# Patient Record
Sex: MALE | Race: WHITE | HISPANIC OR LATINO | URBAN - METROPOLITAN AREA
[De-identification: names, ages, dates, MRNs, and addresses within clinical notes are randomized per-mention and may not be internally consistent; named-entity substitution may affect disease eponyms.]

---

## 2023-11-07 ENCOUNTER — APPOINTMENT (OUTPATIENT)
Dept: PULMONOLOGY | Facility: CLINIC | Age: 58
End: 2023-11-07
Payer: COMMERCIAL

## 2023-12-28 DIAGNOSIS — J45.42 MODERATE PERSISTENT ASTHMA WITH STATUS ASTHMATICUS (HHS-HCC): Primary | ICD-10-CM

## 2023-12-31 RX ORDER — FLUTICASONE PROPIONATE 110 UG/1
1 AEROSOL, METERED RESPIRATORY (INHALATION)
Qty: 12 G | Refills: 3 | Status: SHIPPED | OUTPATIENT
Start: 2023-12-31

## 2024-01-02 ENCOUNTER — APPOINTMENT (OUTPATIENT)
Dept: PULMONOLOGY | Facility: CLINIC | Age: 59
End: 2024-01-02
Payer: COMMERCIAL

## 2024-01-04 DIAGNOSIS — J45.909 MODERATE ASTHMA, UNSPECIFIED WHETHER COMPLICATED, UNSPECIFIED WHETHER PERSISTENT (HHS-HCC): Primary | ICD-10-CM

## 2024-01-05 RX ORDER — FLUTICASONE FUROATE 100 UG/1
1 POWDER RESPIRATORY (INHALATION) DAILY
Qty: 1 EACH | Refills: 1 | Status: SHIPPED | OUTPATIENT
Start: 2024-01-05 | End: 2024-02-20

## 2024-02-06 ENCOUNTER — APPOINTMENT (OUTPATIENT)
Dept: PULMONOLOGY | Facility: CLINIC | Age: 59
End: 2024-02-06
Payer: COMMERCIAL

## 2024-02-18 DIAGNOSIS — J45.909 MODERATE ASTHMA, UNSPECIFIED WHETHER COMPLICATED, UNSPECIFIED WHETHER PERSISTENT (HHS-HCC): ICD-10-CM

## 2024-02-20 RX ORDER — FLUTICASONE FUROATE 100 UG/1
POWDER RESPIRATORY (INHALATION)
Qty: 60 EACH | Refills: 5 | Status: SHIPPED | OUTPATIENT
Start: 2024-02-20

## 2024-02-21 NOTE — PROGRESS NOTES
I had the pleasure seeing Steavn Friend     Chief Complaint   Patient presents with    Chest Pain    Palpitations     He is here today for 10mo follow-up visit.          Current Outpatient Medications   Medication Instructions    Arnuity Ellipta 100 mcg/actuation inhaler USE 1 INHALATION BY MOUTH ONCE  DAILY . RINSE MOUTH WITH WATER  AFTER USE TO REDUCE AFTERTASTE  AND INCIDENCE OF CANDIDIASIS. DO NOT SWALLOW    fluticasone (Flovent HFA) 110 mcg/actuation inhaler 1 puff, inhalation, 2 times daily RT, Rinse mouth with water after use to reduce aftertaste and incidence of candidiasis. Do not swallow.        Stevan Friend is a 58 y.o. with:     Asthma   BYRON on CPAP  Chest Pain / tightness - episodes on and off since pt was in his 20s (history of drug use / cocaine for a couple years clean since around 1988. There was a concern of cardiac event at the time, but further testing ruled out) His pain is on the left side of the chest, axillary area. Non radiating, often associated with tingling in the arm. Not related to exertion or emotional stress.   Palpitations - he has them occasionally and they are relived with a tump. It appears that the heart rate is fast during the episodes.        TESTING    -ECHO/ TTE:  (5/18/23) LVEF 60%.  Sub-optimal image quality.      -CT Cardiac Score: (6/13/23) Total 0    Objective   Physical Exam  {exam; complete:43012}        Assessment/Plan   No problem-specific Assessment & Plan notes found for this encounter.

## 2024-02-23 ENCOUNTER — OFFICE VISIT (OUTPATIENT)
Dept: CARDIOLOGY | Facility: HOSPITAL | Age: 59
End: 2024-02-23
Payer: COMMERCIAL

## 2024-03-07 PROBLEM — R06.00 DYSPNEA: Status: ACTIVE | Noted: 2024-03-07

## 2024-03-07 PROBLEM — J30.9 ALLERGIC RHINITIS: Status: ACTIVE | Noted: 2024-03-07

## 2024-03-07 PROBLEM — J45.909 ASTHMA (HHS-HCC): Status: ACTIVE | Noted: 2024-03-07

## 2024-03-07 PROBLEM — G47.33 OSA ON CPAP: Status: ACTIVE | Noted: 2024-03-07

## 2024-03-07 RX ORDER — BIMATOPROST 0.1 MG/ML
SOLUTION/ DROPS OPHTHALMIC
COMMUNITY
Start: 2023-12-27

## 2024-03-07 RX ORDER — APIXABAN 5 MG/1
5 TABLET, FILM COATED ORAL 2 TIMES DAILY
COMMUNITY
Start: 2023-11-14 | End: 2024-04-01 | Stop reason: SDUPTHER

## 2024-03-07 NOTE — PROGRESS NOTES
"I had the pleasure seeing Stevan Friend     Chief Complaint   Patient presents with    Palpitations    Chest Pain     He is here for an 11 month follow-up visit.          Current Outpatient Medications   Medication Instructions    Arnuity Ellipta 100 mcg/actuation inhaler USE 1 INHALATION BY MOUTH ONCE  DAILY . RINSE MOUTH WITH WATER  AFTER USE TO REDUCE AFTERTASTE  AND INCIDENCE OF CANDIDIASIS. DO NOT SWALLOW    Eliquis 5 mg, oral, 2 times daily    fluticasone (Flovent HFA) 110 mcg/actuation inhaler 1 puff, inhalation, 2 times daily RT, Rinse mouth with water after use to reduce aftertaste and incidence of candidiasis. Do not swallow.    Lumigan 0.01 % ophthalmic solution         Stevan Friend is a 58 y.o. with:     Asthma   BYRON on CPAP  Chest Pain / tightness - episodes on and off since pt was in his 20s (history of drug use / cocaine for a couple years clean since around 1988. There was a concern of cardiac event at the time, but further testing ruled out) His pain is on the left side of the chest, axillary area. Non radiating, often associated with tingling in the arm. Not related to exertion or emotional stress.   Palpitations - he has them occasionally and they are relived with a tump. It appears that the heart rate is fast during the episodes.     TESTING    -ECHO/ TTE:  (5/18/23) LVEF 60%.  Suboptimal image quality.      Objective   Physical Exam  /74 (BP Location: Left arm, Patient Position: Sitting)   Pulse 61   Ht 1.778 m (5' 10\")   Wt 122 kg (268 lb 3.2 oz)   SpO2 97%   BMI 38.48 kg/m²     General Appearance:  Alert, cooperative, no distress, appears stated age   Head:  Normocephalic, without obvious abnormality, atraumatic   Eyes:  PERRL, conjunctiva/corneas clear, EOM's intact, fundi benign, both eyes   Ears:  Normal TM's and external ear canals, both ears   Nose: Nares normal, septum midline, mucosa normal, no drainage or sinus tenderness   Throat: Lips, mucosa, and tongue normal; teeth " and gums normal   Neck: Supple, symmetrical, trachea midline, no adenopathy, thyroid: not enlarged, symmetric, no tenderness/mass/nodules, no carotid bruit or JVD   Back:   Symmetric, no curvature, ROM normal, no CVA tenderness   Lungs:   Clear to auscultation bilaterally, respirations unlabored   Chest Wall:  No tenderness or deformity   Heart:  Regular rate and rhythm, S1, S2 normal, no murmur, rub or gallop   Abdomen:   Soft, non-tender, bowel sounds active all four quadrants,  no masses, no organomegaly   Genitalia:  Normal male   Rectal:  Normal tone, normal prostate, no masses or tenderness;  guaiac negative stool   Extremities: Extremities normal, atraumatic, no cyanosis or edema   Pulses: 2+ and symmetric   Skin: Skin color, texture, turgor normal, no rashes or lesions   Lymph nodes: Cervical, supraclavicular, and axillary nodes normal   Neurologic: Normal           Assessment/Plan   He is here for a follow up. No major symptoms. ECHO was normal. He does have [employer] and monitors his heart rate. The only complain is lack of energy. We discussed weight loss and exercise.     He is on Eliquis because of a DVT and apparently was diagnosed with hypercoagulable state. He was told he needs life long treatment. He wants a second opinion. We will set him up to see one of our vascular medicine specialists.

## 2024-03-08 ENCOUNTER — OFFICE VISIT (OUTPATIENT)
Dept: CARDIOLOGY | Facility: HOSPITAL | Age: 59
End: 2024-03-08
Payer: COMMERCIAL

## 2024-03-08 VITALS
SYSTOLIC BLOOD PRESSURE: 168 MMHG | BODY MASS INDEX: 38.39 KG/M2 | HEART RATE: 61 BPM | HEIGHT: 70 IN | WEIGHT: 268.2 LBS | DIASTOLIC BLOOD PRESSURE: 74 MMHG | OXYGEN SATURATION: 97 %

## 2024-03-08 DIAGNOSIS — I82.5Z2 CHRONIC DEEP VEIN THROMBOSIS (DVT) OF DISTAL VEIN OF LEFT LOWER EXTREMITY (MULTI): Primary | ICD-10-CM

## 2024-03-08 DIAGNOSIS — R07.9 CHEST PAIN, UNSPECIFIED TYPE: ICD-10-CM

## 2024-03-08 PROCEDURE — 93005 ELECTROCARDIOGRAM TRACING: CPT | Performed by: INTERNAL MEDICINE

## 2024-03-08 PROCEDURE — 99214 OFFICE O/P EST MOD 30 MIN: CPT | Performed by: INTERNAL MEDICINE

## 2024-03-15 PROBLEM — J30.2 SEASONAL ALLERGIES: Status: ACTIVE | Noted: 2018-06-04

## 2024-03-15 PROBLEM — E78.5 HYPERLIPIDEMIA: Status: ACTIVE | Noted: 2018-06-04

## 2024-03-22 ENCOUNTER — APPOINTMENT (OUTPATIENT)
Dept: CARDIOLOGY | Facility: CLINIC | Age: 59
End: 2024-03-22
Payer: COMMERCIAL

## 2024-03-22 RX ORDER — SILDENAFIL 50 MG/1
50 TABLET, FILM COATED ORAL AS NEEDED
COMMUNITY
Start: 2024-03-09

## 2024-04-01 ENCOUNTER — CONSULT (OUTPATIENT)
Dept: CARDIOLOGY | Facility: CLINIC | Age: 59
End: 2024-04-01
Payer: COMMERCIAL

## 2024-04-01 VITALS
DIASTOLIC BLOOD PRESSURE: 79 MMHG | WEIGHT: 262 LBS | BODY MASS INDEX: 37.51 KG/M2 | HEIGHT: 70 IN | HEART RATE: 63 BPM | OXYGEN SATURATION: 98 % | SYSTOLIC BLOOD PRESSURE: 139 MMHG

## 2024-04-01 DIAGNOSIS — I82.5Z2 CHRONIC DEEP VEIN THROMBOSIS (DVT) OF DISTAL VEIN OF LEFT LOWER EXTREMITY (MULTI): ICD-10-CM

## 2024-04-01 PROCEDURE — 99214 OFFICE O/P EST MOD 30 MIN: CPT | Performed by: NURSE PRACTITIONER

## 2024-04-01 PROCEDURE — 99204 OFFICE O/P NEW MOD 45 MIN: CPT | Performed by: NURSE PRACTITIONER

## 2024-04-01 RX ORDER — APIXABAN 5 MG/1
5 TABLET, FILM COATED ORAL 2 TIMES DAILY
Qty: 180 TABLET | Refills: 3 | Status: SHIPPED | OUTPATIENT
Start: 2024-04-01 | End: 2025-04-01

## 2024-04-01 NOTE — PROGRESS NOTES
Stevan Friend is a 58 y.o. male     History Of Present Illness   Mr Friend is a 58 year old male with a PMH of Factor V, H/O right iliac vein DVT, hyperlipidemia, COVID 19, chronic back pain, H/O frequent PVCs, obstructive sleep apnea, here for a second opinion.  In May of 2023, he was placed on eliquis and under went coag panels that were positive for Factor V.  He is here to discuss if       Social HX          Family HX  No family history on file.       Review Of Systems   Constitutional: not feeling tired.   Eyes: no eyesight problems.  No vision loss or change in vision  ENT: no hearing loss and no nosebleeds.   Cardiovascular: No intermittent leg claudication,   No chest pain, no tightness or heavy pressure  No shortness of breath,  No palpitations,  No lower extremity edema  The heart rate is regular  Respiratory: no chronic cough and no shortness of breath.   Gastrointestinal: no change in bowel habits and no blood in stools.   Genitourinary: no urinary frequency.   Skin: no skin rashes.   Neurological: No frequent falls.   No dizziness  No weakness  Denies headaches  Psychiatric: no depression and not suicidal.   All other systems have been reviewed and are negative for complaint.        Allergies  Allergies   Allergen Reactions    Atorvastatin Myalgia    Shellfish Containing Products Unknown    Animal Dander Runny nose    House Dust Runny nose    Ragweed Runny nose    Tree And Shrub Pollen Runny nose          Vitals  There were no vitals taken for this visit.        Constitutional: alert and in no acute distress.   Eyes: no erythema, swelling or discharge from the eye .   Neck: neck is supple, symmetric, trachea midline, no masses  and no thyromegaly .   Pulmonary: No increased work of breathing or signs of respiratory distress    Lungs clear to auscultation.    Auscultation of the lungs revealed no expiratory wheezing, normal expiratory time and no inspiratory wheezing. no rales or crackles were heard  bilaterally. no rhonchi  No friction rub. no wheezing.   No diminished breath sounds. no bronchial breath sounds.   Cardiovascular: carotid pulses 2+ bilaterally with no bruit    JVP was normal, no thrills ,   Regular rhythm, normal S1 and S2, no murmurs  the heart rate was normal normal S1, normal S2, no S3, no S4 no murmurs were heard.,   Pedal pulses 2+ bilaterally    No edema .   + engorged varicosities to bilateral thighs and lower legs  Feet turn purple in the dependent position. Color returns to normal with elevation.  Abdomen: abdomen non-tender, no masses  and no hepatomegaly . No pulsatile mass noted  Skin: skin warm and dry, normal skin turgor .   Psychiatric judgment and insight is normal  and oriented to person, place and time .           Current/Home Meds    Current Outpatient Medications:     Arnuity Ellipta 100 mcg/actuation inhaler, USE 1 INHALATION BY MOUTH ONCE  DAILY . RINSE MOUTH WITH WATER  AFTER USE TO REDUCE AFTERTASTE  AND INCIDENCE OF CANDIDIASIS. DO NOT SWALLOW, Disp: 60 each, Rfl: 5    Eliquis 5 mg tablet, Take 1 tablet (5 mg) by mouth 2 times a day., Disp: , Rfl:     fluticasone (Flovent HFA) 110 mcg/actuation inhaler, Inhale 1 puff 2 times a day. Rinse mouth with water after use to reduce aftertaste and incidence of candidiasis. Do not swallow., Disp: 12 g, Rfl: 3    Lumigan 0.01 % ophthalmic solution, , Disp: , Rfl:     sildenafil (Viagra) 50 mg tablet, Take 1 tablet (50 mg) by mouth if needed., Disp: , Rfl:        Labs                   Assessment/Plan      DVT:  +Factor V deficiency  + Right iliac vein DVT  + varicose veins that are painful and engorged  Will have him under go a venous duplex with reflux  He is to wear his compression stockings from am to HS  He is to lose 1-2 pounds per week  He is to exercise for 150 mins a week  He is to continue eliquis BID  He is to elevate his legs above his heart 2-3 times a day for 20-30 mins  Follow up in 2 months or sooner for any  questions, concerns or complaints

## 2024-04-05 ENCOUNTER — TELEPHONE (OUTPATIENT)
Dept: CARDIOLOGY | Facility: CLINIC | Age: 59
End: 2024-04-05
Payer: COMMERCIAL

## 2024-04-05 NOTE — TELEPHONE ENCOUNTER
Answering service message: Needed an Rx for compression socks but wanted a call to know how to get them.

## 2024-04-15 DIAGNOSIS — I82.5Z2 CHRONIC DEEP VEIN THROMBOSIS (DVT) OF DISTAL VEIN OF LEFT LOWER EXTREMITY (MULTI): ICD-10-CM

## 2024-04-15 RX ORDER — APIXABAN 5 MG/1
5 TABLET, FILM COATED ORAL 2 TIMES DAILY
Qty: 180 TABLET | Refills: 3 | Status: CANCELLED | OUTPATIENT
Start: 2024-04-15 | End: 2025-04-15

## 2024-04-16 ENCOUNTER — APPOINTMENT (OUTPATIENT)
Dept: PULMONOLOGY | Facility: HOSPITAL | Age: 59
End: 2024-04-16
Payer: COMMERCIAL

## 2024-04-23 ENCOUNTER — TELEPHONE (OUTPATIENT)
Dept: PULMONOLOGY | Facility: HOSPITAL | Age: 59
End: 2024-04-23
Payer: COMMERCIAL

## 2024-04-23 ENCOUNTER — APPOINTMENT (OUTPATIENT)
Dept: VASCULAR MEDICINE | Facility: HOSPITAL | Age: 59
End: 2024-04-23
Payer: COMMERCIAL

## 2024-04-24 NOTE — TELEPHONE ENCOUNTER
Pt's wife, Ghislaine, called stating that pt's insurance doesn't cover Arnuity anymore. She is requesting for a new inhaler to be prescribed. Pt hasn't been seen by pulmonary since June 2021 and has canceled his last 4 visits with Dr. Radford. She was informed that Dr. Radford may not refill the Arnuity or prescribe a new inhaler due to pt not being seen in office in almost 3 years. It was also explained to pt's wife that it is very important to attend these visits so the providers can continue to prescribe medications. She sounded frustrated, but verbalized understanding. She also mentioned that the pt is out of town until Friday and she was hoping to get him an inhaler prior to his return. Dr. Radford notified.

## 2024-05-17 DIAGNOSIS — J45.902 ASTHMA WITH STATUS ASTHMATICUS, UNSPECIFIED ASTHMA SEVERITY, UNSPECIFIED WHETHER PERSISTENT (HHS-HCC): Primary | ICD-10-CM

## 2024-05-17 RX ORDER — FLUTICASONE PROPIONATE AND SALMETEROL 100; 50 UG/1; UG/1
1 POWDER RESPIRATORY (INHALATION)
COMMUNITY
Start: 2024-04-23 | End: 2024-05-17 | Stop reason: SDUPTHER

## 2024-05-17 RX ORDER — FLUTICASONE PROPIONATE AND SALMETEROL 100; 50 UG/1; UG/1
1 POWDER RESPIRATORY (INHALATION)
Qty: 60 EACH | Refills: 1 | Status: SHIPPED | OUTPATIENT
Start: 2024-05-17 | End: 2024-05-28 | Stop reason: SDUPTHER

## 2024-05-22 ENCOUNTER — HOSPITAL ENCOUNTER (OUTPATIENT)
Dept: VASCULAR MEDICINE | Facility: HOSPITAL | Age: 59
Discharge: HOME | End: 2024-05-22
Payer: COMMERCIAL

## 2024-05-22 DIAGNOSIS — I82.5Z2 CHRONIC DEEP VEIN THROMBOSIS (DVT) OF DISTAL VEIN OF LEFT LOWER EXTREMITY (MULTI): ICD-10-CM

## 2024-05-22 DIAGNOSIS — M79.89 OTHER SPECIFIED SOFT TISSUE DISORDERS: ICD-10-CM

## 2024-05-22 PROCEDURE — 93970 EXTREMITY STUDY: CPT | Performed by: SURGERY

## 2024-05-22 PROCEDURE — 93970 EXTREMITY STUDY: CPT

## 2024-05-28 DIAGNOSIS — J45.902 ASTHMA WITH STATUS ASTHMATICUS, UNSPECIFIED ASTHMA SEVERITY, UNSPECIFIED WHETHER PERSISTENT (HHS-HCC): ICD-10-CM

## 2024-05-28 RX ORDER — FLUTICASONE PROPIONATE AND SALMETEROL 100; 50 UG/1; UG/1
POWDER RESPIRATORY (INHALATION)
Refills: 0 | OUTPATIENT
Start: 2024-05-28

## 2024-05-28 RX ORDER — FLUTICASONE PROPIONATE AND SALMETEROL 100; 50 UG/1; UG/1
1 POWDER RESPIRATORY (INHALATION)
Qty: 60 EACH | Refills: 1 | Status: SHIPPED | OUTPATIENT
Start: 2024-05-28

## 2024-06-05 ENCOUNTER — OFFICE VISIT (OUTPATIENT)
Dept: CARDIOLOGY | Facility: CLINIC | Age: 59
End: 2024-06-05
Payer: COMMERCIAL

## 2024-06-05 VITALS
HEIGHT: 70 IN | WEIGHT: 257 LBS | DIASTOLIC BLOOD PRESSURE: 73 MMHG | OXYGEN SATURATION: 98 % | SYSTOLIC BLOOD PRESSURE: 146 MMHG | HEART RATE: 68 BPM | BODY MASS INDEX: 36.79 KG/M2

## 2024-06-05 DIAGNOSIS — I83.899 COMPLICATED VARICOSE VEINS: Primary | ICD-10-CM

## 2024-06-05 PROCEDURE — 99213 OFFICE O/P EST LOW 20 MIN: CPT | Performed by: NURSE PRACTITIONER

## 2024-06-05 NOTE — PROGRESS NOTES
Stevan Friend is a 58 y.o. male     History Of Present Illness   Mr Friend is a 58 year old male with a PMH of Factor V, H/O right iliac vein DVT, hyperlipidemia, COVID 19, chronic back pain, H/O frequent PVCs, obstructive sleep apnea, her for a follow up of his varicose veins and to discuss the results of his most recent venous duplex with reflux scan.   In May of 2023, he was placed on eliquis and under went coag panels that were positive for Factor V.       Social HX          Family HX  No family history on file.       Review Of Systems   Constitutional: not feeling tired.   Eyes: no eyesight problems.  No vision loss or change in vision  ENT: no hearing loss and no nosebleeds.   Cardiovascular: No intermittent leg claudication,   No chest pain, no tightness or heavy pressure  No shortness of breath,  No palpitations,  + painful, cramping legs with lower extremity edema  The heart rate is regular  +bilateral varicose veins  Respiratory: no chronic cough and no shortness of breath.   Gastrointestinal: no change in bowel habits and no blood in stools.   Genitourinary: no urinary frequency.   Skin: no skin rashes.   Neurological: No frequent falls.   No dizziness  No weakness  Denies headaches  Psychiatric: no depression and not suicidal.   All other systems have been reviewed and are negative for complaint.        Allergies  Allergies   Allergen Reactions    Atorvastatin Myalgia    Shellfish Containing Products Unknown    Animal Dander Runny nose    House Dust Runny nose    Ragweed Runny nose    Tree And Shrub Pollen Runny nose          Vitals  Visit Vitals  /73 (BP Location: Left arm, Patient Position: Sitting)   Pulse 68           Physical Exam  Cardiovascular: carotid pulses 2+ bilaterally with no bruit    JVP was normal, no thrills ,   Regular rhythm, normal S1 and S2, no murmurs  the heart rate was normal normal S1, normal S2, no S3, no S4 no murmurs were heard.,   Pedal pulses 2+ bilaterally    No  edema .   + engorged varicosities to bilateral thighs and lower legs  Feet turn purple in the dependent position. Color returns to normal with elevation.      Current/Home Meds    Current Outpatient Medications:     Arnuity Ellipta 100 mcg/actuation inhaler, USE 1 INHALATION BY MOUTH ONCE  DAILY . RINSE MOUTH WITH WATER  AFTER USE TO REDUCE AFTERTASTE  AND INCIDENCE OF CANDIDIASIS. DO NOT SWALLOW, Disp: 60 each, Rfl: 5    Eliquis 5 mg tablet, Take 1 tablet (5 mg) by mouth 2 times a day., Disp: 180 tablet, Rfl: 3    fluticasone (Flovent HFA) 110 mcg/actuation inhaler, Inhale 1 puff 2 times a day. Rinse mouth with water after use to reduce aftertaste and incidence of candidiasis. Do not swallow., Disp: 12 g, Rfl: 3    Lumigan 0.01 % ophthalmic solution, , Disp: , Rfl:     sildenafil (Viagra) 50 mg tablet, Take 1 tablet (50 mg) by mouth if needed., Disp: , Rfl:     Wixela Inhub 100-50 mcg/dose diskus inhaler, Inhale 1 puff 2 times a day., Disp: 60 each, Rfl: 1       Labs                 Cardiac Service Results:  VENOUS DUPLEX WITH REFLUX: Right Lower Venous Insufficiency: Reflux is noted in the saphenofemoral junction vein. There is reflux noted in the common femoral vein. No other reflux is seen.  There is a tributary that arises off of the GSV in the mid thigh and travels throughout the thigh and calf. No reflux is noted in this tributary.  The SSV arises proximal to the popliteal([place]). No reflux is seen in the Vein of [place].  Left Lower Venous Insufficiency: Reflux is noted in the saphenofemoral junction vein. There is reflux noted in the common femoral vein. There is a tributary that arises off of the GSV in the mid thigh with 6.1 seconds of reflux.  There is a (4.5mm) seen in the distal calf with 4.8 seconds of reflux.  The SSV arises proximal to the popliteal. No reflux is noted in the Vein of [place].  Right Lower Venous: No evidence of acute deep vein thrombus visualized in the right lower  extremity.  Left Lower Venous: No evidence of acute deep vein thrombus visualized in the left lower extremity.      Assessment/Plan      COMPLICATED VARICOSE VEINS:  As above, venous duplex shows evidence of venous reflux bilaterally. He is wearing his compression stockings as directed, he is elevating his legs and exercising as directed with only mild relief in the edema, cramping and pain.  Will place a referral to Dr Lopez in the vein clinic for further evaluation and treatment.  He will follow up with me as needed.

## 2024-06-11 ENCOUNTER — OFFICE VISIT (OUTPATIENT)
Dept: VASCULAR SURGERY | Facility: CLINIC | Age: 59
End: 2024-06-11
Payer: COMMERCIAL

## 2024-06-11 VITALS
BODY MASS INDEX: 37.45 KG/M2 | WEIGHT: 261 LBS | HEART RATE: 71 BPM | SYSTOLIC BLOOD PRESSURE: 153 MMHG | DIASTOLIC BLOOD PRESSURE: 91 MMHG

## 2024-06-11 DIAGNOSIS — I83.899 COMPLICATED VARICOSE VEINS: ICD-10-CM

## 2024-06-11 PROCEDURE — 1036F TOBACCO NON-USER: CPT | Performed by: SURGERY

## 2024-06-11 PROCEDURE — 99203 OFFICE O/P NEW LOW 30 MIN: CPT | Performed by: SURGERY

## 2024-06-11 NOTE — PROGRESS NOTES
Subjective   Patient ID: Stevan Friend is a 58 y.o. male who presents for New Patient Visit (Varicose veins/ DVT ).  HPI  Patient is here for evaluation of both lower extremity secondary to varicose veins.  He has some burning in the popliteal space bilaterally left slightly worse than right.  Some skin discoloration minimally noted both lower extremities minimal ankle swelling noted bilaterally  Patient has had prior history of iliac thrombus on the right side currently on Eliquis secondary to factor V Leiden deficiency.  No shortness of breath or chest pain no evidence of previous pulmonary emboli noted.  He does not claudicate  No ulcer sores noted  .  Review of Systems  Review of Systems    Constitutional:  no generalized malaise overall feels well, energy levels intact, no complaints specifically noted  HEENT:  No blurry vision, no visual aides noted, no hearing loss no ear ache no nose bleeds noted, no dysphagia, no congestion otherwise no pertinent positives noted  Cardiovascular:  no palpitations, chest pain or heaviness noted, no leg swelling, no numbness or tingling in the lower extremity noted  Respiratory:  no shortness of breath, no productive cough noted, no conversation dyspnea or difficulty breathing  Gastrointestinal:  no abdominal pain, no nausea or vomiting, appetite intact, no bowel irregularities noted  Genitourinary:   no urinary incontinence, frequency or urgency issues noted, no hematuria or burning sensation issues  Musculoskeletal:  No muscle aches or pains, no joint discomfort noted, no back pain noted otherwise feels well  Skin: no ulcerations, skin color issues or wounds upper or lower extremities  Neurologic: no dizziness, no hemiplegia, no hemiparesis, no obvious visual deficits noted  Psychiatric: no depression, no memory loss noted, no suicidal ideation  Endocrine: no weight loss or gain, no temperature concerns hot or cold intolerance  Hemogolotic/Lymphatic: no bruising, excessive  bleeding, no swelling in the groins or neck noted    Objective   Physical Exam  Physical exam    Constitutional: alert and in no acute distress verbal  Eyes: No erythema swelling or discharge noted  Neck: supple, symmetric, trachea midline, no masses noted  Cardiovascular: Carotid pulses 2+, no obvious bruit, no Jugular distension noted, no thrill, heart regular rate, lower extremity vascular exam intact, cap refill <2 sec  Pulmonary:  Bilateral breath sounds intact, clear with rales rhonchi or wheeze  Abdomen: soft non tender, no pulsatile masses noted, no rebound rigidity or guarding noted  Skin: intact warm no abnormal turgor  Psychiatric: alert without any obvious cognitive issues, oriented to person, place, and time    Assessment/Plan   Symptomatic varicose veins bilateral extremity  Minimal ankle swelling will check IVC duplex secondary to previous history of iliac thrombus  Reflux study demonstrates common femoral reflux however minimal superficial disease noted.  Follow-up after testing further recommendations pending.  Continue compression elevation and regular activity and ambulation program.         Micheal Manning DO 06/11/24 3:16 PM

## 2024-06-12 DIAGNOSIS — J45.902 ASTHMA WITH STATUS ASTHMATICUS, UNSPECIFIED ASTHMA SEVERITY, UNSPECIFIED WHETHER PERSISTENT (HHS-HCC): ICD-10-CM

## 2024-06-12 RX ORDER — FLUTICASONE PROPIONATE AND SALMETEROL 100; 50 UG/1; UG/1
1 POWDER RESPIRATORY (INHALATION)
Qty: 3 EACH | Refills: 3 | Status: SHIPPED | OUTPATIENT
Start: 2024-06-12

## 2024-06-25 ENCOUNTER — OFFICE VISIT (OUTPATIENT)
Dept: PULMONOLOGY | Facility: HOSPITAL | Age: 59
End: 2024-06-25
Payer: COMMERCIAL

## 2024-06-25 VITALS
OXYGEN SATURATION: 97 % | WEIGHT: 258 LBS | BODY MASS INDEX: 37.02 KG/M2 | TEMPERATURE: 97.5 F | HEART RATE: 72 BPM | DIASTOLIC BLOOD PRESSURE: 89 MMHG | SYSTOLIC BLOOD PRESSURE: 154 MMHG

## 2024-06-25 DIAGNOSIS — G47.33 OSA (OBSTRUCTIVE SLEEP APNEA): Primary | ICD-10-CM

## 2024-06-25 DIAGNOSIS — J45.902 ASTHMA WITH STATUS ASTHMATICUS, UNSPECIFIED ASTHMA SEVERITY, UNSPECIFIED WHETHER PERSISTENT (HHS-HCC): ICD-10-CM

## 2024-06-25 DIAGNOSIS — R07.89 CHEST PRESSURE: ICD-10-CM

## 2024-06-25 DIAGNOSIS — G47.33 OSA ON CPAP: ICD-10-CM

## 2024-06-25 PROCEDURE — 99214 OFFICE O/P EST MOD 30 MIN: CPT | Performed by: INTERNAL MEDICINE

## 2024-06-25 PROCEDURE — 1036F TOBACCO NON-USER: CPT | Performed by: INTERNAL MEDICINE

## 2024-06-25 RX ORDER — ALBUTEROL SULFATE 90 UG/1
2 AEROSOL, METERED RESPIRATORY (INHALATION) EVERY 4 HOURS PRN
Qty: 8.5 G | Refills: 11 | Status: SHIPPED | OUTPATIENT
Start: 2024-06-25 | End: 2025-06-25

## 2024-06-25 RX ORDER — FLUTICASONE PROPIONATE AND SALMETEROL 250; 50 UG/1; UG/1
1 POWDER RESPIRATORY (INHALATION)
Qty: 60 EACH | Refills: 11 | Status: SHIPPED | OUTPATIENT
Start: 2024-06-25 | End: 2025-06-25

## 2024-06-25 ASSESSMENT — LIFESTYLE VARIABLES: HOW OFTEN DO YOU HAVE A DRINK CONTAINING ALCOHOL: NEVER

## 2024-06-25 ASSESSMENT — PAIN SCALES - GENERAL: PAINLEVEL: 2

## 2024-06-25 NOTE — PROGRESS NOTES
Department of Medicine  Division of Pulmonary, Critical Care, and Sleep Medicine  Follow-Up Visit  [address] [facility]    Background:  here for follow up from last visit 4/7/2021    HPI (DATE):  CC: occasional dyspnea    58 year old male presenting for follow up of asthma.     Methacholine challenge was positive and the patients history of worsening symptoms immediately after exposure to chemical inhalation, on top of childhood asthma.   Past workup also included finding allergy to dust mites on RAP; Immunoglobulins wnl. CBC without significant eosinophilia.      BYRON- using NIV, hasn't seen sleep in awhile- will refer     Feels frustrated with weight, has been having issues lately with symptoms.     ACT today of 24, last was 23.       Asthma triggers: strong odors, strenuous activity.  Methacholine challenge PC(20): 0.47     Asthma  - Continue Albuterol HFA 1-2puff q4-6h as needed for shortness of breath.  - Wixela 250 1 inhalation daily and rinse after   - Avoid known triggers.  Needs refills     Allergic rhinitis  - Continue Fluticasone nasal   - Start Azelastine nasal spray twice daily     Obesity- discussed dietary improvements, can further discuss with PCP     BYRON on NIV- will refer to sleep    ROS: having some chest pressure in the morning, no radiating, 1-3/10, no associated chest pain,palpitations, or sob; is worse after eating junk food, some GERD symptoms, no fevers, chills, nausea, emesis, diarrhea, edema, other ros reviewed and negative for complaints     No new changes in PMHx     Immunization History:  Immunization History   Administered Date(s) Administered    Moderna SARS-CoV-2 Vaccination 03/25/2021, 04/24/2021, 12/03/2021, 08/15/2022       Current Medications:  Current Outpatient Medications   Medication Instructions    Arnuity Ellipta 100 mcg/actuation inhaler USE 1 INHALATION BY MOUTH ONCE  DAILY . RINSE MOUTH WITH WATER  AFTER USE TO REDUCE AFTERTASTE  AND INCIDENCE OF  CANDIDIASIS. DO NOT SWALLOW    Eliquis 5 mg, oral, 2 times daily    fluticasone (Flovent HFA) 110 mcg/actuation inhaler 1 puff, inhalation, 2 times daily RT, Rinse mouth with water after use to reduce aftertaste and incidence of candidiasis. Do not swallow.    fluticasone propion-salmeteroL (Wixela Inhub) 100-50 mcg/dose diskus inhaler 1 puff, inhalation, 2 times daily RT    Lumigan 0.01 % ophthalmic solution     sildenafil (VIAGRA) 50 mg, oral, As needed        Drug Allergies/Intolerances:  Allergies   Allergen Reactions    Atorvastatin Myalgia    Shellfish Containing Products Unknown    Animal Dander Runny nose    House Dust Runny nose    Ragweed Runny nose    Tree And Shrub Pollen Runny nose        Physical Examination:  There were no vitals taken for this visit.     General: ambulated independently; no acute distress; well-nourished; work of breathing was not increased; normal vocal character  HEENT: normocephalic; anicteric sclerae; conjunctivae not injected; nasal mucosa was unremarkable; oropharynx was clear without evidence of thrush; dentition was good.  Neck: supple; no lymphadenopathy or thyromegaly.  Chest: clear to auscultation bilaterally; no chest wall deformity.  Cardiac: regular rhythm; no gallop or murmur.  Extremities: no leg edema; no digital clubbing; 2+ pulses  Psychiatric: did not appear depressed or anxious.    Pulmonary Function Test Results       As above, prior    Chest Radiograph     CHEST 2 VIEW 07/08/2020    Narrative  MRN: [id number]  Patient Name: IRVIN HARRIS    STUDY:   CHEST 2 VIEW PA AND LAT;  7/8/2020 5:54 pm    INDICATION:  Unspecified asthma, uncomplicated.    COMPARISON:  None.    ACCESSION NUMBER(S):  [id number]    ORDERING CLINICIAN:  BING RAMIREZ    FINDINGS:      CARDIOMEDIASTINAL SILHOUETTE:  Cardiomediastinal silhouette is normal in size and configuration.    LUNGS:  Lungs are clear.    ABDOMEN:  No remarkable upper abdominal findings.    BONES:  No acute osseous  changes.    Impression  1.  No evidence of acute cardiopulmonary process.      Echocardiogram     No results found for this or any previous visit from the past 365 days.       Chest CT Scan     No results found for this or any previous visit from the past 365 days.     Hematologic Parameters     WBC   Date/Time Value Ref Range Status   07/09/2020 03:33 PM 5.7 4.4 - 11.3 x10E9/L Final     Neutrophils Absolute   Date/Time Value Ref Range Status   07/09/2020 03:33 PM 3.28 1.20 - 7.70 x10E9/L Final     Neutrophils %   Date/Time Value Ref Range Status   07/09/2020 03:33 PM 57.5 40.0 - 80.0 % Final     Lymphocytes %   Date/Time Value Ref Range Status   07/09/2020 03:33 PM 34.6 13.0 - 44.0 % Final     Monocytes %   Date/Time Value Ref Range Status   07/09/2020 03:33 PM 5.6 2.0 - 10.0 % Final     Basophils %   Date/Time Value Ref Range Status   07/09/2020 03:33 PM 0.3 0.0 - 2.0 % Final     Eosinophils Absolute   Date/Time Value Ref Range Status   07/09/2020 03:33 PM 0.10 0.00 - 0.70 x10E9/L Final     Eosinophils %   Date/Time Value Ref Range Status   07/09/2020 03:33 PM 1.7 0.0 - 6.0 % Final     Hemoglobin   Date/Time Value Ref Range Status   07/09/2020 03:33 PM 15.5 13.5 - 17.5 g/dL Final     Hematocrit   Date/Time Value Ref Range Status   07/09/2020 03:33 PM 47.4 41.0 - 52.0 % Final     RBC   Date/Time Value Ref Range Status   07/09/2020 03:33 PM 5.06 4.50 - 5.90 x10E12/L Final     MCV   Date/Time Value Ref Range Status   07/09/2020 03:33 PM 94 80 - 100 fL Final     MCHC   Date/Time Value Ref Range Status   07/09/2020 03:33 PM 32.7 32.0 - 36.0 g/dL Final     Platelets   Date/Time Value Ref Range Status   07/09/2020 03:33  150 - 450 x10E9/L Final       Immunology Laboratory Tests     Immunocap IgE   Date/Time Value Ref Range Status   07/09/2020 03:33 PM 24.6 0.0 - 214.0 KU/L Final     Comment:      Note:  Omalizumab (Xolair, GenentPerBlue; humanized    IgG1 antihuman IgE Fc) treatment does not    significantly interfere  with the accuracy of    total IgE on the ImmunoCAP (Ygle) platform.    J Allergy Clin Immunol 2006;117:759-66).   Allergens, parasitic diseases, smoking, and   alcohol consumption have been reported to   increase levels of total IgE in serum.       Aspergillus Fumigatus IgE   Date/Time Value Ref Range Status   07/09/2020 03:33 PM <0.35 <0.35 KU/L Final     Comment:       SEE IMMUNOCAP INTERP.IGE      Total IgG   Date/Time Value Ref Range Status   07/09/2020 03:33  700 - 1,600 mg/dL Final     Comment:     MONOCLONAL PROTEINS MAY CAUSE FALSELY LOW  RESULTS IN THIS ASSAY. SERUM PROTEIN  ELECTROPHORESIS SHOULD BE DONE AS THE  FIRST TEST TO EVALUATE MONOCLONAL GAMMOPATHY.       IgA   Date/Time Value Ref Range Status   07/09/2020 03:33  70 - 400 mg/dL Final     Comment:     MONOCLONAL PROTEINS MAY CAUSE FALSELY LOW  RESULTS IN THIS ASSAY. SERUM PROTEIN  ELECTROPHORESIS SHOULD BE DONE AS THE  FIRST TEST TO EVALUATE MONOCLONAL GAMMOPATHY.       IgM   Date/Time Value Ref Range Status   07/09/2020 03:33 PM 82 40 - 230 mg/dL Final     Comment:     MONOCLONAL PROTEINS MAY CAUSE FALSELY LOW  RESULTS IN THIS ASSAY. SERUM PROTEIN  ELECTROPHORESIS SHOULD BE DONE AS THE  FIRST TEST TO EVALUATE MONOCLONAL GAMMOPATHY.       Assessment and Plan / Recommendations:  Problem List Items Addressed This Visit       Asthma ([facility]    Current Assessment & Plan     Continue using inhalers    Per the chart- looks like advair is covered- will send and if need generic your pharmacy will be able to assist in switching             Relevant Medications    albuterol (ProAir HFA) 90 mcg/actuation inhaler    fluticasone propion-salmeteroL (Advair Diskus) 250-50 mcg/dose diskus inhaler    BYRON on CPAP    Current Assessment & Plan     Follow up with Sleep team- referred         Chest pressure    Current Assessment & Plan     Per your symptoms- sounds like could be related to GERD- please take your acid reflux pill x 1 months 15-30 min prior  to dinner and see if improves, also discuss with your PCP          Other Visit Diagnoses       BYRON (obstructive sleep apnea)    -  Primary    Relevant Orders    Referral to Adult Sleep Medicine          Office #[phone], can use  option or nurses line   [place] [facility] # [phone]     Radiology Scheduling #[phone]         Follow-up: 7/9/2024     Dania Radford MD   06/25/2024

## 2024-06-25 NOTE — ASSESSMENT & PLAN NOTE
Continue using inhalers    Per the chart- looks like advair is covered- will send and if need generic your pharmacy will be able to assist in switching

## 2024-06-25 NOTE — ASSESSMENT & PLAN NOTE
Per your symptoms- sounds like could be related to GERD- please take your acid reflux pill x 1 months 15-30 min prior to dinner and see if improves, also discuss with your PCP

## 2024-07-09 ENCOUNTER — APPOINTMENT (OUTPATIENT)
Dept: PULMONOLOGY | Facility: HOSPITAL | Age: 59
End: 2024-07-09
Payer: COMMERCIAL

## 2024-07-12 ENCOUNTER — APPOINTMENT (OUTPATIENT)
Dept: VASCULAR MEDICINE | Facility: HOSPITAL | Age: 59
End: 2024-07-12
Payer: COMMERCIAL

## 2024-07-16 ENCOUNTER — APPOINTMENT (OUTPATIENT)
Dept: VASCULAR SURGERY | Facility: CLINIC | Age: 59
End: 2024-07-16
Payer: COMMERCIAL

## 2024-07-23 ENCOUNTER — APPOINTMENT (OUTPATIENT)
Dept: VASCULAR MEDICINE | Facility: HOSPITAL | Age: 59
End: 2024-07-23
Payer: COMMERCIAL

## 2024-07-23 ENCOUNTER — HOSPITAL ENCOUNTER (OUTPATIENT)
Dept: VASCULAR MEDICINE | Facility: HOSPITAL | Age: 59
Discharge: HOME | End: 2024-07-23
Payer: COMMERCIAL

## 2024-07-23 DIAGNOSIS — M79.89 OTHER SPECIFIED SOFT TISSUE DISORDERS: ICD-10-CM

## 2024-07-23 DIAGNOSIS — I83.899 COMPLICATED VARICOSE VEINS: ICD-10-CM

## 2024-07-23 PROCEDURE — 93978 VASCULAR STUDY: CPT

## 2024-07-31 ENCOUNTER — APPOINTMENT (OUTPATIENT)
Dept: VASCULAR MEDICINE | Facility: CLINIC | Age: 59
End: 2024-07-31
Payer: COMMERCIAL

## 2024-08-08 ENCOUNTER — APPOINTMENT (OUTPATIENT)
Dept: VASCULAR SURGERY | Facility: CLINIC | Age: 59
End: 2024-08-08
Payer: COMMERCIAL

## 2024-08-08 VITALS
DIASTOLIC BLOOD PRESSURE: 83 MMHG | HEART RATE: 83 BPM | BODY MASS INDEX: 37.45 KG/M2 | SYSTOLIC BLOOD PRESSURE: 169 MMHG | WEIGHT: 261 LBS

## 2024-08-08 DIAGNOSIS — R60.0 EDEMA OF RIGHT LOWER EXTREMITY: Primary | ICD-10-CM

## 2024-08-08 NOTE — PROGRESS NOTES
Subjective   Patient ID: Stevan Friend is a 58 y.o. male who presents for Follow-up (Iliac thrombosis ).  HPI  Patient is overall doing well who sprained his ankle so there is some ecchymosis around the ankle as well as down into his foot  He is active though no other pain or discomfort  No chest pain or shortness of breath noted.    Review of Systems    Objective   Physical Exam  Physical exam    Constitutional: alert and in no acute distress verbal  Eyes: No erythema swelling or discharge noted  Neck: supple, symmetric, trachea midline, no masses noted  Cardiovascular: Carotid pulses 2+, no obvious bruit, no Jugular distension noted, no thrill, heart regular rate, lower extremity vascular exam intact, cap refill <2 sec  Pulmonary:  Bilateral breath sounds intact, clear with rales rhonchi or wheeze  Abdomen: soft non tender, no pulsatile masses noted, no rebound rigidity or guarding noted  Skin: intact warm no abnormal turgor  Psychiatric: alert without any obvious cognitive issues, oriented to person, place, and time    Assessment/Plan   Chronic venous insufficiency  Recent IVC duplex demonstrates no evidence of IVC thrombus or iliac thrombus  Continue anticoagulation  Follow-up in 6 months  We repeat briefly discussed possibility of venogram however at this time once remains conservative complete continue compression elevation activity.           Micheal Manning DO 08/08/24 11:37 AM

## 2024-09-23 DIAGNOSIS — J45.909 ASTHMA, UNSPECIFIED ASTHMA SEVERITY, UNSPECIFIED WHETHER COMPLICATED, UNSPECIFIED WHETHER PERSISTENT (HHS-HCC): Primary | ICD-10-CM

## 2024-09-24 DIAGNOSIS — J45.909 ASTHMA, UNSPECIFIED ASTHMA SEVERITY, UNSPECIFIED WHETHER COMPLICATED, UNSPECIFIED WHETHER PERSISTENT (HHS-HCC): ICD-10-CM

## 2024-09-24 RX ORDER — FLUTICASONE PROPIONATE AND SALMETEROL 100; 50 UG/1; UG/1
1 POWDER RESPIRATORY (INHALATION)
Qty: 90 EACH | Refills: 3 | Status: SHIPPED | OUTPATIENT
Start: 2024-09-24 | End: 2024-09-25

## 2024-09-25 RX ORDER — FLUTICASONE PROPIONATE AND SALMETEROL 100; 50 UG/1; UG/1
1 POWDER RESPIRATORY (INHALATION)
Qty: 60 EACH | Refills: 11 | Status: SHIPPED | OUTPATIENT
Start: 2024-09-25 | End: 2024-09-26 | Stop reason: SDUPTHER

## 2024-09-26 DIAGNOSIS — J45.909 ASTHMA, UNSPECIFIED ASTHMA SEVERITY, UNSPECIFIED WHETHER COMPLICATED, UNSPECIFIED WHETHER PERSISTENT (HHS-HCC): ICD-10-CM

## 2024-09-26 RX ORDER — FLUTICASONE PROPIONATE AND SALMETEROL 100; 50 UG/1; UG/1
1 POWDER RESPIRATORY (INHALATION)
Qty: 180 EACH | Refills: 3 | Status: SHIPPED | OUTPATIENT
Start: 2024-09-26

## 2024-11-07 ENCOUNTER — TELEPHONE (OUTPATIENT)
Dept: CARDIOLOGY | Facility: HOSPITAL | Age: 59
End: 2024-11-07

## 2024-11-07 DIAGNOSIS — I82.5Z2 CHRONIC DEEP VEIN THROMBOSIS (DVT) OF DISTAL VEIN OF LEFT LOWER EXTREMITY (MULTI): ICD-10-CM

## 2024-11-07 RX ORDER — APIXABAN 5 MG/1
5 TABLET, FILM COATED ORAL 2 TIMES DAILY
Qty: 180 TABLET | Refills: 6 | Status: SHIPPED | OUTPATIENT
Start: 2024-11-07 | End: 2026-07-30

## 2024-11-07 NOTE — TELEPHONE ENCOUNTER
Ghsilaine on behalf of Stevan left a VM stating that he has not taken his Eliquis now 2 doses.  Not cony for a refill for 11 days but lost his script. Asking for help with what to do.  [phone]

## 2025-02-11 ENCOUNTER — APPOINTMENT (OUTPATIENT)
Dept: VASCULAR SURGERY | Facility: HOSPITAL | Age: 60
End: 2025-02-11
Payer: COMMERCIAL

## 2025-04-28 DIAGNOSIS — I82.5Z2 CHRONIC DEEP VEIN THROMBOSIS (DVT) OF DISTAL VEIN OF LEFT LOWER EXTREMITY: ICD-10-CM

## 2025-04-28 RX ORDER — APIXABAN 5 MG/1
5 TABLET, FILM COATED ORAL 2 TIMES DAILY
Qty: 180 TABLET | Refills: 6 | Status: SHIPPED | OUTPATIENT
Start: 2025-04-28 | End: 2027-01-18

## 2025-05-21 DIAGNOSIS — J45.909 ASTHMA, UNSPECIFIED ASTHMA SEVERITY, UNSPECIFIED WHETHER COMPLICATED, UNSPECIFIED WHETHER PERSISTENT (HHS-HCC): ICD-10-CM

## 2025-05-21 RX ORDER — FLUTICASONE PROPIONATE AND SALMETEROL 100; 50 UG/1; UG/1
1 POWDER RESPIRATORY (INHALATION)
Qty: 3 EACH | Refills: 0 | Status: SHIPPED | OUTPATIENT
Start: 2025-05-21

## 2025-05-27 ENCOUNTER — APPOINTMENT (OUTPATIENT)
Dept: VASCULAR SURGERY | Facility: HOSPITAL | Age: 60
End: 2025-05-27
Payer: COMMERCIAL

## 2025-06-10 ENCOUNTER — APPOINTMENT (OUTPATIENT)
Dept: VASCULAR SURGERY | Facility: HOSPITAL | Age: 60
End: 2025-06-10
Payer: COMMERCIAL

## 2025-06-17 ENCOUNTER — OFFICE VISIT (OUTPATIENT)
Dept: VASCULAR SURGERY | Facility: HOSPITAL | Age: 60
End: 2025-06-17
Payer: COMMERCIAL

## 2025-06-17 VITALS
DIASTOLIC BLOOD PRESSURE: 72 MMHG | HEART RATE: 68 BPM | WEIGHT: 270 LBS | SYSTOLIC BLOOD PRESSURE: 122 MMHG | BODY MASS INDEX: 38.74 KG/M2

## 2025-06-17 DIAGNOSIS — R60.0 BILATERAL LOWER EXTREMITY EDEMA: Primary | ICD-10-CM

## 2025-06-17 DIAGNOSIS — Z86.718 HISTORY OF DVT (DEEP VEIN THROMBOSIS): ICD-10-CM

## 2025-06-17 PROCEDURE — 99214 OFFICE O/P EST MOD 30 MIN: CPT | Performed by: SURGERY

## 2025-06-17 PROCEDURE — 1036F TOBACCO NON-USER: CPT | Performed by: SURGERY

## 2025-06-17 RX ORDER — LOSARTAN POTASSIUM 50 MG/1
50 TABLET ORAL
COMMUNITY
Start: 2025-05-19

## 2025-06-17 ASSESSMENT — ENCOUNTER SYMPTOMS
SPEECH DIFFICULTY: 0
SEIZURES: 0
CONSTIPATION: 0
NAUSEA: 0
COUGH: 0
DIARRHEA: 0
ADENOPATHY: 0
PALPITATIONS: 0
WOUND: 0
NECK PAIN: 0
FATIGUE: 0
JOINT SWELLING: 0
NUMBNESS: 0
SLEEP DISTURBANCE: 0
WEAKNESS: 0
SORE THROAT: 0
FACIAL ASYMMETRY: 0
CONFUSION: 0
ARTHRALGIAS: 0
DIFFICULTY URINATING: 0
SHORTNESS OF BREATH: 0
DIZZINESS: 0
LIGHT-HEADEDNESS: 0
TROUBLE SWALLOWING: 0
HEADACHES: 0
COLOR CHANGE: 0
WHEEZING: 0
VOMITING: 0
FEVER: 0
ABDOMINAL PAIN: 0
CHILLS: 0

## 2025-06-17 NOTE — PROGRESS NOTES
"Subjective   Patient ID: Stevan Montana \"Tony\" is a 59 y.o. male who presents for Follow-up (Iliac stenosis ).  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 Overall health has been OK. Sprained his right ankle last year, still having problems and instability and pain with it. Seeing orthopedics, has MRI pending. Has been wearing compression stockings daily. Lower extremity swelling has improved. No wounds or ulcerations. Does have VV but not particularly bothersome. Ambulatory, active as he can be with his ankle. Wants to start swimming. Travels very frequently via airtravel for work. Taking eliquis as directed without issue.     7/23/2024 IVC iliac duplex  CONCLUSIONS:  Aorta/Common Iliac Arteries/IVC: The inferior vena cava appears patent with no evidence of thrombosis. The bilateral iliac veins appear widely patent with no evidence of thrombosis.  Additional Findings:  Difficult exam due to body habitus.    5/22/24 Bilateral VI  CONCLUSIONS:  Right Lower Venous Insufficiency: Reflux is noted in the saphenofemoral junction vein. There is reflux noted in the common femoral vein. No other reflux is seen.  There is a tributary that arises off of the GSV in the mid thigh and travels throughout the thigh and calf. No reflux is noted in this tributary.  The SSV arises proximal to the popliteal([place]i). No reflux is seen in the Vein of Giacomini.  Left Lower Venous Insufficiency: Reflux is noted in the saphenofemoral junction vein. There is reflux noted in the common femoral vein. There is a tributary that arises off of the GSV in the mid thigh with 6.1 seconds of reflux.  There is a (4.5mm) seen in the distal calf with 4.8 seconds of reflux.  The SSV arises proximal to the popliteal. No reflux is noted in the Vein of Giacomini.  Right Lower Venous: No evidence of acute deep vein thrombus visualized in " the right lower extremity.  Left Lower Venous: No evidence of acute deep vein thrombus visualized in the left lower extremity.     Imaging & Doppler Findings:     Right          Compress Thrombus  Diam   Depth    Time  SFJ              Yes      None   8.3 mm 24.5 mm 1.00 sec  Prox Thigh GSV   Yes      None  Mid Thigh GSV    Yes      None  Knee GSV         Yes      None  Prox Calf GSV    Yes      None  Mid Calf GSV     Yes      None  Dist Calf GSV    Yes      None  SPJ              Yes      None  SSV Prox         Yes      None  SSV Mid          Yes      None  SSV Distal       Yes      None  Common FV                                       1.30 sec        Left           Compress Thrombus  Diam   Depth    Time  SFJ              Yes      None   8.9 mm 29.5 mm 2.30 sec  Prox Thigh GSV   Yes      None  Mid Thigh GSV    Yes      None  Knee GSV         Yes      None  Prox Calf GSV    Yes      None  Mid Calf GSV     Yes      None  Dist Calf GSV    Yes      None  SPJ              Yes      None  SSV Prox         Yes      None  SSV Mid          Yes      None  SSV Distal       Yes      None  Common FV                                       2.10 sec        Right                 Compressible Thrombus        Flow  Distal External Iliac                None  CFV                       Yes        None   Spontaneous/Phasic  PFV                       Yes        None  FV Proximal               Yes        None   Spontaneous/Phasic  FV Mid                    Yes        None  FV Distal                 Yes        None  Popliteal                 Yes        None   Spontaneous/Phasic  Peroneal                  Yes        None  PTV                       Yes        None        Left                  Compress Thrombus        Flow  Distal External Iliac            None  CFV                     Yes      None   Spontaneous/Phasic  PFV                     Yes      None  FV Proximal             Yes      None   Spontaneous/Phasic  FV Mid                   Yes      None  FV Distal               Yes      None  Popliteal               Yes      None   Spontaneous/Phasic  Peroneal                Yes      None  PTV                     Yes      None        Review of Systems   Constitutional:  Negative for chills, fatigue and fever.   HENT:  Negative for congestion, sore throat and trouble swallowing.    Eyes:  Negative for visual disturbance.   Respiratory:  Negative for cough, shortness of breath and wheezing.    Cardiovascular:  Positive for leg swelling. Negative for chest pain and palpitations.   Gastrointestinal:  Negative for abdominal pain, constipation, diarrhea, nausea and vomiting.   Endocrine: Negative for cold intolerance and heat intolerance.   Genitourinary:  Negative for difficulty urinating.   Musculoskeletal:  Negative for arthralgias, joint swelling and neck pain.   Skin:  Negative for color change and wound.   Neurological:  Negative for dizziness, seizures, syncope, facial asymmetry, speech difficulty, weakness, light-headedness, numbness and headaches.   Hematological:  Negative for adenopathy.   Psychiatric/Behavioral:  Negative for behavioral problems, confusion and sleep disturbance.      Vitals:    06/17/25 0915   BP: 122/72   Pulse: 68   Weight: 122 kg (270 lb)      Objective   Physical Exam  Constitutional:       Appearance: Normal appearance.   HENT:      Head: Normocephalic.      Right Ear: External ear normal.      Left Ear: External ear normal.      Nose: Nose normal.      Mouth/Throat:      Mouth: Mucous membranes are moist.   Eyes:      Extraocular Movements: Extraocular movements intact.   Neck:      Vascular: No carotid bruit.   Cardiovascular:      Rate and Rhythm: Normal rate and regular rhythm.      Pulses: Normal pulses.      Comments: Mild edema bilaterally, no wounds or ulcerations  Pulmonary:      Effort: Pulmonary effort is normal. No respiratory distress.      Breath sounds: Normal breath sounds.   Abdominal:      Palpations:  Abdomen is soft.      Tenderness: There is no abdominal tenderness.   Musculoskeletal:         General: No swelling or tenderness.      Cervical back: Normal range of motion and neck supple.      Right lower leg: No edema.      Left lower leg: No edema.   Skin:     General: Skin is warm and dry.      Capillary Refill: Capillary refill takes less than 2 seconds.      Findings: No lesion.   Neurological:      General: No focal deficit present.      Mental Status: He is alert and oriented to person, place, and time. Mental status is at baseline.   Psychiatric:         Mood and Affect: Mood normal.         Behavior: Behavior normal.         Thought Content: Thought content normal.         Judgment: Judgment normal.         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   - Chronic venous insufficiency  -Bilateral VI 2024 with deep venous reflux bilaterally  -History of Right iliac vein thrombus 2023, factor V on Eliquis  -Swelling stable, ambulatory, minimally symptomatic  -bilateral VV minimally symptomatic  -continue conservative management, compression, elevation, regular walking regimen, weight management  -briefly discussed option of venogram IVUS if symptoms worsen, will remain conservative at this time  -continue eliquis  -follow up 1 year  -call with new or worsening symptoms, questions or concerns  -patient in agreement with above, all questions answered        Marisol Jimenez PA-C 06/17/25 10:19 AM

## 2025-07-14 ENCOUNTER — TELEPHONE (OUTPATIENT)
Dept: CARDIOLOGY | Facility: HOSPITAL | Age: 60
End: 2025-07-14
Payer: COMMERCIAL

## 2025-07-14 NOTE — TELEPHONE ENCOUNTER
Pt spouse Ghislaine HARRIS requesting to speak with Dr. Kerri BARRERA regarding Eliquis hold for ankle surgery coming up in September.

## 2025-08-11 ENCOUNTER — APPOINTMENT (OUTPATIENT)
Dept: PULMONOLOGY | Facility: HOSPITAL | Age: 60
End: 2025-08-11
Payer: COMMERCIAL

## 2025-08-12 DIAGNOSIS — Z86.718 HISTORY OF DVT (DEEP VEIN THROMBOSIS): Primary | ICD-10-CM

## 2025-08-12 RX ORDER — ENOXAPARIN SODIUM 150 MG/ML
120 INJECTION SUBCUTANEOUS EVERY 12 HOURS
Qty: 6 EACH | Refills: 0 | Status: SHIPPED | OUTPATIENT
Start: 2025-08-12 | End: 2025-08-18

## 2025-08-18 ENCOUNTER — OFFICE VISIT (OUTPATIENT)
Dept: PULMONOLOGY | Facility: HOSPITAL | Age: 60
End: 2025-08-18
Payer: COMMERCIAL

## 2025-08-18 ENCOUNTER — HOSPITAL ENCOUNTER (OUTPATIENT)
Dept: RADIOLOGY | Facility: HOSPITAL | Age: 60
Discharge: HOME | End: 2025-08-18
Payer: COMMERCIAL

## 2025-08-18 VITALS
DIASTOLIC BLOOD PRESSURE: 75 MMHG | SYSTOLIC BLOOD PRESSURE: 110 MMHG | BODY MASS INDEX: 37.59 KG/M2 | OXYGEN SATURATION: 95 % | WEIGHT: 262 LBS | HEART RATE: 78 BPM | TEMPERATURE: 96 F

## 2025-08-18 DIAGNOSIS — R06.02 SHORTNESS OF BREATH: ICD-10-CM

## 2025-08-18 DIAGNOSIS — B96.89 BACTERIAL SINUSITIS: ICD-10-CM

## 2025-08-18 DIAGNOSIS — J45.902 ASTHMA WITH STATUS ASTHMATICUS, UNSPECIFIED ASTHMA SEVERITY, UNSPECIFIED WHETHER PERSISTENT (HHS-HCC): ICD-10-CM

## 2025-08-18 DIAGNOSIS — J32.9 BACTERIAL SINUSITIS: ICD-10-CM

## 2025-08-18 DIAGNOSIS — J30.9 ALLERGIC RHINITIS, UNSPECIFIED SEASONALITY, UNSPECIFIED TRIGGER: ICD-10-CM

## 2025-08-18 DIAGNOSIS — J45.20 MILD INTERMITTENT ASTHMA WITHOUT COMPLICATION (HHS-HCC): Primary | ICD-10-CM

## 2025-08-18 PROCEDURE — 99213 OFFICE O/P EST LOW 20 MIN: CPT | Performed by: INTERNAL MEDICINE

## 2025-08-18 PROCEDURE — 71046 X-RAY EXAM CHEST 2 VIEWS: CPT

## 2025-08-18 RX ORDER — DOXYCYCLINE HYCLATE 100 MG
100 TABLET ORAL 2 TIMES DAILY
Qty: 10 TABLET | Refills: 0 | Status: SHIPPED | OUTPATIENT
Start: 2025-08-18 | End: 2025-08-23

## 2025-08-18 RX ORDER — ALBUTEROL SULFATE 0.83 MG/ML
3 SOLUTION RESPIRATORY (INHALATION) ONCE
OUTPATIENT
Start: 2025-08-18 | End: 2025-08-18

## 2025-08-18 RX ORDER — AZELASTINE 1 MG/ML
1 SPRAY, METERED NASAL 2 TIMES DAILY
Qty: 30 ML | Refills: 1 | Status: SHIPPED | OUTPATIENT
Start: 2025-08-18 | End: 2026-08-18

## 2025-08-18 RX ORDER — FLUTICASONE PROPIONATE AND SALMETEROL 250; 50 UG/1; UG/1
1 POWDER RESPIRATORY (INHALATION)
Qty: 180 EACH | Refills: 3 | Status: SHIPPED | OUTPATIENT
Start: 2025-08-18 | End: 2025-08-19 | Stop reason: SDUPTHER

## 2025-08-18 RX ORDER — ALBUTEROL SULFATE 90 UG/1
1 INHALANT RESPIRATORY (INHALATION) ONCE
OUTPATIENT
Start: 2025-08-18

## 2025-08-18 ASSESSMENT — PAIN SCALES - GENERAL: PAINLEVEL_OUTOF10: 0-NO PAIN

## 2025-08-18 ASSESSMENT — LIFESTYLE VARIABLES
HOW OFTEN DO YOU HAVE SIX OR MORE DRINKS ON ONE OCCASION: NEVER
HOW MANY STANDARD DRINKS CONTAINING ALCOHOL DO YOU HAVE ON A TYPICAL DAY: PATIENT DOES NOT DRINK
AUDIT-C TOTAL SCORE: 0
HOW OFTEN DO YOU HAVE A DRINK CONTAINING ALCOHOL: NEVER
SKIP TO QUESTIONS 9-10: 1

## 2025-08-19 DIAGNOSIS — J45.902 ASTHMA WITH STATUS ASTHMATICUS, UNSPECIFIED ASTHMA SEVERITY, UNSPECIFIED WHETHER PERSISTENT (HHS-HCC): ICD-10-CM

## 2025-08-19 RX ORDER — FLUTICASONE PROPIONATE AND SALMETEROL 250; 50 UG/1; UG/1
1 POWDER RESPIRATORY (INHALATION)
Qty: 60 EACH | Refills: 0 | Status: SHIPPED | OUTPATIENT
Start: 2025-08-19 | End: 2026-08-19

## 2025-09-03 DIAGNOSIS — J45.902 ASTHMA WITH STATUS ASTHMATICUS, UNSPECIFIED ASTHMA SEVERITY, UNSPECIFIED WHETHER PERSISTENT (HHS-HCC): ICD-10-CM

## 2025-09-05 RX ORDER — FLUTICASONE PROPIONATE AND SALMETEROL 250; 50 UG/1; UG/1
1 POWDER RESPIRATORY (INHALATION)
Qty: 180 EACH | Refills: 3 | Status: SHIPPED | OUTPATIENT
Start: 2025-09-05 | End: 2026-09-05

## 2025-10-13 ENCOUNTER — APPOINTMENT (OUTPATIENT)
Dept: OTOLARYNGOLOGY | Facility: CLINIC | Age: 60
End: 2025-10-13
Payer: COMMERCIAL